# Patient Record
Sex: FEMALE | Race: OTHER | URBAN - METROPOLITAN AREA
[De-identification: names, ages, dates, MRNs, and addresses within clinical notes are randomized per-mention and may not be internally consistent; named-entity substitution may affect disease eponyms.]

---

## 2019-02-21 NOTE — PATIENT DISCUSSION
New Prescription: prednisolone acetate (prednisolone acetate): drops,suspension: 1% 1 drop four times a day as directed into left eye 02-

## 2019-02-21 NOTE — PATIENT DISCUSSION
"- Most likely unrelated to her recent ? NSTEMI. Reportedly had normal ECG, asymptomatic apart from chronic ""throat pain"". Suspect that her elevated troponins might actually be due to her dermatomyositis.  May not have had an actual NSTEMI

## 2019-02-21 NOTE — PATIENT DISCUSSION
- Unclear etiology, but may be related to her dermatomyositis (has had a probable flare of this recently)

## 2022-07-28 ENCOUNTER — CONSULTATION/EVALUATION (OUTPATIENT)
Dept: URBAN - METROPOLITAN AREA CLINIC 20 | Facility: CLINIC | Age: 77
End: 2022-07-28

## 2022-07-28 DIAGNOSIS — H35.363: ICD-10-CM

## 2022-07-28 DIAGNOSIS — H25.813: ICD-10-CM

## 2022-07-28 PROCEDURE — 92134 CPTRZ OPH DX IMG PST SGM RTA: CPT

## 2022-07-28 PROCEDURE — 92136 OPHTHALMIC BIOMETRY: CPT

## 2022-07-28 PROCEDURE — 92004 COMPRE OPH EXAM NEW PT 1/>: CPT

## 2022-07-28 ASSESSMENT — KERATOMETRY
OS_K1POWER_DIOPTERS: 41.25
OS_AXISANGLE2_DEGREES: 94
OS_K2POWER_DIOPTERS: 41.50
OS_AXISANGLE_DEGREES: 4
OD_AXISANGLE2_DEGREES: 80
OD_K2POWER_DIOPTERS: 42.00
OD_K1POWER_DIOPTERS: 41.50
OD_AXISANGLE_DEGREES: 170

## 2022-07-28 ASSESSMENT — TONOMETRY
OD_IOP_MMHG: 12
OS_IOP_MMHG: 14

## 2022-07-28 ASSESSMENT — VISUAL ACUITY
OU_SC: J10
OU_SC: 20/40
OD_SC: 20/30-2
OS_SC: 20/70+1

## 2022-08-05 ENCOUNTER — PRE-OP/H&P (OUTPATIENT)
Dept: URBAN - METROPOLITAN AREA CLINIC 20 | Facility: CLINIC | Age: 77
End: 2022-08-05

## 2022-08-05 DIAGNOSIS — H25.813: ICD-10-CM

## 2022-08-05 PROCEDURE — 99211PRE PRE OP VISIT

## 2022-08-09 ASSESSMENT — KERATOMETRY
OD_AXISANGLE_DEGREES: 170
OS_K1POWER_DIOPTERS: 41.25
OD_K1POWER_DIOPTERS: 41.50
OD_K2POWER_DIOPTERS: 42.00
OS_AXISANGLE2_DEGREES: 94
OD_AXISANGLE2_DEGREES: 80
OS_K2POWER_DIOPTERS: 41.50
OS_AXISANGLE_DEGREES: 4